# Patient Record
Sex: FEMALE
[De-identification: names, ages, dates, MRNs, and addresses within clinical notes are randomized per-mention and may not be internally consistent; named-entity substitution may affect disease eponyms.]

---

## 2018-04-28 ENCOUNTER — HOSPITAL ENCOUNTER (EMERGENCY)
Dept: HOSPITAL 42 - ED | Age: 28
Discharge: HOME | End: 2018-04-28
Payer: MEDICAID

## 2018-04-28 VITALS — SYSTOLIC BLOOD PRESSURE: 100 MMHG | OXYGEN SATURATION: 99 % | HEART RATE: 74 BPM | DIASTOLIC BLOOD PRESSURE: 58 MMHG

## 2018-04-28 VITALS — TEMPERATURE: 98.3 F

## 2018-04-28 VITALS — RESPIRATION RATE: 18 BRPM

## 2018-04-28 DIAGNOSIS — R10.9: Primary | ICD-10-CM

## 2018-04-28 LAB
ALBUMIN SERPL-MCNC: 4.7 G/DL (ref 3–4.8)
ALBUMIN/GLOB SERPL: 1.6 {RATIO} (ref 1.1–1.8)
ALT SERPL-CCNC: 28 U/L (ref 7–56)
AST SERPL-CCNC: 28 U/L (ref 14–36)
BASOPHILS # BLD AUTO: 0.03 K/MM3 (ref 0–2)
BASOPHILS NFR BLD: 0.3 % (ref 0–3)
BUN SERPL-MCNC: 9 MG/DL (ref 7–21)
CALCIUM SERPL-MCNC: 9.1 MG/DL (ref 8.4–10.5)
EOSINOPHIL # BLD: 0.4 10*3/UL (ref 0–0.7)
EOSINOPHIL NFR BLD: 3.8 % (ref 1.5–5)
ERYTHROCYTE [DISTWIDTH] IN BLOOD BY AUTOMATED COUNT: 16 % (ref 11.5–14.5)
GFR NON-AFRICAN AMERICAN: > 60
GRANULOCYTES # BLD: 7.66 10*3/UL (ref 1.4–6.5)
GRANULOCYTES NFR BLD: 71.6 % (ref 50–68)
HGB BLD-MCNC: 12 G/DL (ref 12–16)
LIPASE SERPL-CCNC: 74 U/L (ref 23–300)
LYMPHOCYTES # BLD: 1.8 10*3/UL (ref 1.2–3.4)
LYMPHOCYTES NFR BLD AUTO: 16.4 % (ref 22–35)
MCH RBC QN AUTO: 25.2 PG (ref 25–35)
MCHC RBC AUTO-ENTMCNC: 32.6 G/DL (ref 31–37)
MCV RBC AUTO: 77.1 FL (ref 80–105)
MONOCYTES # BLD AUTO: 0.8 10*3/UL (ref 0.1–0.6)
MONOCYTES NFR BLD: 7.9 % (ref 1–6)
PLATELET # BLD: 311 10^3/UL (ref 120–450)
PMV BLD AUTO: 9.5 FL (ref 7–11)
RBC # BLD AUTO: 4.77 10^6/UL (ref 3.5–6.1)
WBC # BLD AUTO: 10.7 10^3/UL (ref 4.5–11)

## 2018-04-28 PROCEDURE — 80053 COMPREHEN METABOLIC PANEL: CPT

## 2018-04-28 PROCEDURE — 96361 HYDRATE IV INFUSION ADD-ON: CPT

## 2018-04-28 PROCEDURE — 83690 ASSAY OF LIPASE: CPT

## 2018-04-28 PROCEDURE — 99284 EMERGENCY DEPT VISIT MOD MDM: CPT

## 2018-04-28 PROCEDURE — 85025 COMPLETE CBC W/AUTO DIFF WBC: CPT

## 2018-04-28 PROCEDURE — 96375 TX/PRO/DX INJ NEW DRUG ADDON: CPT

## 2018-04-28 PROCEDURE — 96374 THER/PROPH/DIAG INJ IV PUSH: CPT

## 2018-04-28 PROCEDURE — 74177 CT ABD & PELVIS W/CONTRAST: CPT

## 2018-04-28 NOTE — CT
PROCEDURE:  CT Abdomen and Pelvis with contrast



HISTORY:

abd pain r/o appy r/o cholecystitis



COMPARISON:

None.



TECHNIQUE:

Contrast dose: 100 milliliters visi 350



Radiation dose:



Total exam DLP = 208 mGy-cm.



This CT exam was performed using one or more of the following dose 

reduction techniques: Automated exposure control, adjustment of the 

mA and/or kV according to patient size, and/or use of iterative 

reconstruction technique.



FINDINGS:



LOWER THORAX:

No infiltrate or effusion is seen at the lung bases.  Visualized 

esophagus, stomach, and duodenum are unremarkable although the 

stomach is not adequately distended limiting evaluation for wall 

thickening. 



LIVER:

Liver shows no evidence of focal mass or intrahepatic ductal 

dilatation.  Liver is normal in size. 



GALLBLADDER AND BILE DUCTS:

Unremarkable. 



PANCREAS:

Unremarkable. No gross lesion or ductal dilatation.



SPLEEN:

Unremarkable. 



ADRENALS:

Unremarkable. No mass. 



KIDNEYS AND URETERS:

Unremarkable. No hydronephrosis. No solid mass. 



VASCULATURE:

Unremarkable. No aortic aneurysm. 



BOWEL:

Unremarkable. No obstruction. No gross mural thickening. 



APPENDIX:

Normal appendix. 



PERITONEUM:

Unremarkable. No free fluid. No free air. 



LYMPH NODES:

Unremarkable. No enlarged lymph nodes. 



BLADDER:

Unremarkable. 



REPRODUCTIVE:

Unremarkable. 



BONES:

No acute fracture. 



OTHER FINDINGS:

None.



IMPRESSION:

Unremarkable CT scan of the abdomen for acute inflammatory process.  

No appreciable CT scan evidence of cholecystitis or appendicitis.

## 2019-02-14 ENCOUNTER — HOSPITAL ENCOUNTER (EMERGENCY)
Dept: HOSPITAL 42 - ED | Age: 29
LOS: 1 days | Discharge: HOME | End: 2019-02-15
Payer: MEDICAID

## 2019-02-14 DIAGNOSIS — B34.9: Primary | ICD-10-CM

## 2019-02-14 LAB
APPEARANCE UR: CLEAR
BILIRUB UR-MCNC: NEGATIVE MG/DL
COLOR UR: YELLOW
GLUCOSE UR STRIP-MCNC: NEGATIVE MG/DL
HCG,QUALITATIVE URINE: NEGATIVE
INFLUENZA A B: (no result)
LEUKOCYTE ESTERASE UR-ACNC: NEGATIVE LEU/UL
PH UR STRIP: 8.5 [PH] (ref 4.7–8)
PROT UR STRIP-MCNC: NEGATIVE MG/DL
RBC # UR STRIP: NEGATIVE /UL
SP GR UR STRIP: 1.01 (ref 1–1.03)
UROBILINOGEN UR STRIP-ACNC: 0.2 E.U./DL

## 2019-02-14 NOTE — ED PDOC
Arrival/HPI





- General


Chief Complaint: Fever


Time Seen by Provider: 02/14/19 22:08


Historian: Patient





- History of Present Illness


Narrative History of Present Illness (Text): 





02/14/19 22:23


28 year old female, with no significant past medical history, presents to the 

emergency department with fever and cough, since today.  Patient states she also

has associated headache, and body aches.  Patient states her temperature at home

was 102.  Patient informs she did not try any NSAID antipyretic at home.  

Patient denies any dysuria, chest pain, shortness of breath, abdominal pain, 

nausea, vomiting, diarrhea, back pain, neck pain, or any other complaint.  


Time/Duration: 24 hours


Symptom Onset: Gradual


Symptom Course: Unchanged


Quality: Aching


Activities at Onset: Light


Context: Home





Past Medical History





- Provider Review


Nursing Documentation Reviewed: Yes





- Infectious Disease


Hx of Infectious Diseases: None





- Reproductive


Currently Pregnant: No





- Psychiatric


Hx Substance Use: No





- Anesthesia


Hx Anesthesia: No


Hx Anesthesia Reactions: No


Hx Malignant Hyperthermia: No





Family/Social History





- Physician Review


Nursing Documentation Reviewed: Yes


Family/Social History: No Known Family HX


Smoking Status: Never Smoked


Hx Alcohol Use: No


Hx Substance Use: No





Allergies/Home Meds


Allergies/Adverse Reactions: 


Allergies





No Known Allergies Allergy (Verified 02/14/19 22:16)


   











Review of Systems





- Physician Review


All systems were reviewed & negative as marked: Yes





- Review of Systems


Constitutional: Fevers


Respiratory: Cough.  absent: SOB


Cardiovascular: absent: Chest Pain


Gastrointestinal: absent: Abdominal Pain, Diarrhea, Nausea, Vomiting


Genitourinary Female: absent: Dysuria


Musculoskeletal: absent: Back Pain, Neck Pain


Neurological: Headache





Physical Exam


Vital Signs Reviewed: Yes





Vital Signs











  Temp Pulse Resp BP Pulse Ox


 


 02/14/19 22:15  102.2 F H  116 H  18  112/76  98











Temperature: Febrile


Blood Pressure: Normal


Pulse: Tachycardic


Respiratory Rate: Normal


Appearance: Positive for: Well-Appearing, Non-Toxic, Comfortable


Pain Distress: None


Mental Status: Positive for: Alert and Oriented X 3





- Systems Exam


Head: Present: Atraumatic, Normocephalic


Pupils: Present: PERRL


Extroacular Muscles: Present: EOMI


Conjunctiva: Present: Normal


Mouth: Present: Moist Mucous Membranes


Neck: Present: Normal Range of Motion


Respiratory/Chest: Present: Clear to Auscultation, Good Air Exchange.  No: 

Respiratory Distress, Accessory Muscle Use


Cardiovascular: Present: Regular Rate and Rhythm, Normal S1, S2.  No: Murmurs


Abdomen: No: Tenderness, Distention, Peritoneal Signs


Back: Present: Normal Inspection


Upper Extremity: Present: Normal Inspection.  No: Cyanosis, Edema


Lower Extremity: Present: Normal Inspection.  No: Edema


Neurological: Present: GCS=15, CN II-XII Intact, Speech Normal


Skin: Present: Warm, Dry, Normal Color.  No: Rashes


Psychiatric: Present: Alert, Oriented x 3, Normal Insight, Normal Concentration





Medical Decision Making


ED Course and Treatment: 





02/14/19 22:29


Impression: 28 year old female presents with flu-like symptoms





Plan:


-- Chest X-ray


-- Tylenol


-- Rapid Strep


-- Influenza A/B


-- Urinalysis 


-- Reassess and disposition





Prior Visits:


Notes and results from previous visits were reviewed





Progress Notes:   


02/14/19 23:49


Chest X-ray reviewed by me, shows:


No active disease





02/15/19 00:09


Patient asked me to examine a mole on her back which she reports is "getting 

better". I advised patient to seek outpatient dermatology for biopsy..





02/15/19 01:32


symtoms x 1 day lungs cta cxr neg ua neg. HR on dc 90s, feeling better. susepct 

tachhycardia 2/2 fever. unleikely sepsis. strict return precations





- RAD Interpretation


Radiology Orders: 











02/14/19 22:21


CHEST TWO VIEWS (PA/LAT) [RAD] Stat 














- Medication Orders


Current Medication Orders: 











Acetaminophen (Tylenol 325mg Tab)  975 mg PO STAT STA


   Stop: 02/14/19 22:22











- Scribe Statement


The provider has reviewed the documentation as recorded by the Scribe


Lawrence Leyva





Provider Scribe Attestation:


All medical record entries made by the Scribe were at my direction and 

personally dictated by me. I have reviewed the chart and agree that the record 

accurately reflects my personal performance of the history, physical exam, 

medical decision making, and the department course for this patient. I have also

 personally directed, reviewed, and agree with the discharge instructions and 

disposition.











Disposition/Present on Arrival





- Present on Arrival


Any Indicators Present on Arrival: No


History of DVT/PE: No


History of Uncontrolled Diabetes: No


Urinary Catheter: No


History of Decub. Ulcer: No


History Surgical Site Infection Following: None





- Disposition


Have Diagnosis and Disposition been Completed?: Yes


Diagnosis: 


 Viral syndrome





Disposition: HOME/ ROUTINE


Disposition Time: 12:30


Condition: STABLE


Discharge Instructions (ExitCare):  Flu, Cough, Runny Nose, and the Common Cold,

 Viral Syndrome (DC)


Additional Instructions: 


return to any emergency room with worsening. 


Prescriptions: 


Oseltamivir Phosphate [Tamiflu] 75 mg PO BID #10 capsule


Referrals: 


Gianluca Grajeda MD [Primary Care Provider] - Follow up with primary


Forms:  [employer] (English)

## 2019-02-15 VITALS
RESPIRATION RATE: 16 BRPM | TEMPERATURE: 99.2 F | SYSTOLIC BLOOD PRESSURE: 106 MMHG | OXYGEN SATURATION: 97 % | DIASTOLIC BLOOD PRESSURE: 57 MMHG | HEART RATE: 92 BPM

## 2019-02-15 NOTE — RAD
Date of service: 



02/14/2019



HISTORY:

 cough 



COMPARISON:

No prior.



TECHNIQUE:

Chest PA and lateral



FINDINGS:



LUNGS:

No radiographic evidence of focal consolidation in the lungs.



PLEURA:

No significant pleural effusion identified. No pneumothorax apparent.



CARDIOVASCULAR:

No aortic atherosclerotic calcification present.



Normal cardiac size. No pulmonary vascular congestion. 



OSSEOUS STRUCTURES:

No significant abnormalities.



VISUALIZED UPPER ABDOMEN:

Normal.



OTHER FINDINGS:

None.



IMPRESSION:

No evidence of pneumonia or consolidation in the lungs.

## 2023-01-23 NOTE — ED PDOC
Arrival/HPI





- General


Chief Complaint: Abdominal Pain


Time Seen by Provider: 04/28/18 08:22


Historian: Patient





- History of Present Illness


Narrative History of Present Illness (Text): 


04/28/18 08:36





A 27 year old female, with no significant past medical history, presents to the 

emergency room complaining of 2 week duration right sided abdominal pain and 

joint pain. She describes the pain as sharp and intermittent. The patient 

states that she has also been experiencing decreased appetite, a decrease in 

urine output, and some loose stools. The patient denies fevers, chills, headache

, dizziness, sore throat, cough, chest pain, shortness of breath, dyspnea on 

exertion, nausea, vomiting, diarrhea, neck/back pain, or any other complaint. 


 


Time/Duration: Other (2 weeks)


Symptom Onset: Gradual


Symptom Course: Unchanged


Activities at Onset: Rest, Light


Context: Home





Past Medical History





- Provider Review


Nursing Documentation Reviewed: Yes





- Infectious Disease


Hx of Infectious Diseases: None





- Psychiatric


Hx Substance Use: No





- Anesthesia


Hx Anesthesia: No


Hx Anesthesia Reactions: No


Hx Malignant Hyperthermia: No





Family/Social History





- Physician Review


Nursing Documentation Reviewed: Yes


Family/Social History: No Known Family HX


Smoking Status: Never Smoked


Hx Alcohol Use: No


Hx Substance Use: No





Allergies/Home Meds


Allergies/Adverse Reactions: 


Allergies





No Known Allergies Allergy (Verified 04/28/18 08:31)


 











Review of Systems





- Physician Review


All systems were reviewed & negative as marked: Yes





- Review of Systems


Constitutional: absent: Fevers, Night Sweats


ENT: absent: Sore Throat


Respiratory: absent: SOB, Cough


Cardiovascular: absent: Chest Pain, GILBERT


Gastrointestinal: Abdominal Pain, Stool Changes (Loose stools), Appetite 

Changes (Decreased appetite).  absent: Diarrhea, Nausea, Vomiting


Genitourinary Female: Urine Output Changes (Decreased urine output.)


Musculoskeletal: Arthralgias.  absent: Back Pain, Neck Pain


Neurological: absent: Headache, Dizziness





Physical Exam


Vital Signs Reviewed: Yes


Vital Signs











  Temp Pulse Resp BP Pulse Ox


 


 04/28/18 13:02  98.3 F  74  18  100/58 L  99


 


 04/28/18 12:00   82  18  102/79  98


 


 04/28/18 10:14   88  18  110/79  98


 


 04/28/18 08:34  98.3 F    


 


 04/28/18 08:27   116 H  20  109/58 L  99











Temperature: Afebrile


Blood Pressure: Hypotensive


Pulse: Tachycardic


Respiratory Rate: Normal


Appearance: Positive for: Well-Appearing, Non-Toxic, Comfortable


Pain Distress: None


Mental Status: Positive for: Alert and Oriented X 3





- Systems Exam


Head: Present: Atraumatic, Normocephalic


Pupils: Present: PERRL


Extroacular Muscles: Present: EOMI


Conjunctiva: Present: Normal


Mouth: Present: Moist Mucous Membranes


Neck: Present: Normal Range of Motion


Respiratory/Chest: Present: Clear to Auscultation, Good Air Exchange.  No: 

Respiratory Distress, Accessory Muscle Use


Cardiovascular: Present: Tachycardic


Abdomen: Present: Tenderness (Tenderness to RUQ and RLQ), Guarding.  No: Rebound

, McBurney's Point Tender


Back: Present: Normal Inspection


Upper Extremity: Present: Normal Inspection.  No: Cyanosis, Edema


Lower Extremity: Present: Normal Inspection.  No: Edema


Neurological: Present: GCS=15, CN II-XII Intact, Speech Normal


Skin: Present: Warm, Dry, Normal Color.  No: Rashes


Psychiatric: Present: Alert, Oriented x 3, Normal Insight, Normal Concentration





Medical Decision Making


ED Course and Treatment: 


04/28/18 08:42





Impression:


A 27 year old female presents to the emergency room complaining of 2 week 

duration sharp, intermittent right sided abdominal pain, joint pain, decreased 

appetite, and loose stools. 





r/o Cholecytitis r/o Appy





Plan:


-- Abdomen/Pelvis CT


-- Labs


-- Pepcid 


-- Reassess and disposition








Progress Notes:








PROCEDURE:  CT Abdomen and Pelvis with contrast


Dictator : David Goldman MD


Report Date : 04/28/2018 12:15:36


IMPRESSION: Unremarkable CT scan of the abdomen for acute inflammatory process.

  No appreciable CT scan evidence of cholecystitis or appendicitis.





04/28/18 12:52: On re-evaluation the patient feels better and is in no acute 

distress. Abdomen is non-tender. Patient is tolerating fluids. I have discussed 

the results and plan with the patient, who expresses understanding. Patient is 

in agreement with the plan to discharge the patient home. Patient is stable for 

discharge. Patient was instructed to follow up with PMD in 1-2 days or return 

if symptoms persist/worsen or new concerning symptoms arise. 











- Lab Interpretations


Lab Results: 








 04/28/18 09:00 





 04/28/18 09:00 





 Lab Results





04/28/18 09:00: Sodium 141, Potassium 3.5 L, Chloride 103, Carbon Dioxide 22, 

Anion Gap 19, BUN 9, Creatinine 0.5 L, Est GFR ( Amer) > 60, Est GFR (Non

-Af Amer) > 60, Random Glucose 92, Calcium 9.1, Total Bilirubin 0.3, AST 28, 

ALT 28, Alkaline Phosphatase 82, Total Protein 7.7, Albumin 4.7, Globulin 3.0, 

Albumin/Globulin Ratio 1.6, Lipase 74


04/28/18 09:00: WBC 10.7, RBC 4.77, Hgb 12.0, Hct 36.8, MCV 77.1 L, MCH 25.2, 

MCHC 32.6, RDW 16.0 H, Plt Count 311, MPV 9.5, Gran % 71.6 H, Lymph % (Auto) 

16.4 L, Mono % (Auto) 7.9 H, Eos % (Auto) 3.8, Baso % (Auto) 0.3, Gran # 7.66 H

, Lymph # (Auto) 1.8, Mono # (Auto) 0.8 H, Eos # (Auto) 0.4, Baso # (Auto) 0.03








I have reviewed the lab results: Yes





- RAD Interpretation


Radiology Orders: 








04/28/18 08:32


ABD PELVIS PO & IV CONTRAST [CT] Stat 














- Medication Orders


Current Medication Orders: 











Discontinued Medications





Famotidine (Pepcid)  20 mg IVP STAT STA


   Stop: 04/28/18 08:33


   Last Admin: 04/28/18 08:59  Dose: 20 mg





IVP Administration


 Document     04/28/18 08:59  EWO  (Rec: 04/28/18 09:00  HIPOLITO [id number])


     Charges for Administration


      # of IVP Administrations                   1





Sodium Chloride (Sodium Chloride 0.9%)  1,000 mls @ 1,000 mls/hr IV .Q1H STA


   Stop: 04/28/18 09:31


   Last Admin: 04/28/18 09:00  Dose: 1,000 mls/hr





eMAR Start Stop


 Document     04/28/18 09:00  HIPOLITO  (Rec: 04/28/18 09:00  HIPOLITO [id number])


     Intravenous Solution


      Start Date                                 04/28/18


      Start Time                                 09:00


      End Date                                   04/28/18


      End time                                   10:00


      Total Infusion Time                        60





Ketorolac Tromethamine (Toradol)  30 mg IVP STAT STA


   Stop: 04/28/18 08:33


   Last Admin: 04/28/18 09:00  Dose: 30 mg





MAR Pain Assessment


 Document     04/28/18 09:00  EWO  (Rec: 04/28/18 09:00  [employer]  [id number])


     Pain Reassessment


      Is this a pain reassessment?               No


     Sleep


      Is patient sleeping during reassessment?   No


     Presence of Pain


      Presence of Pain                           Yes


     Pain Scale Used


      Pain Scale Used                            Numeric


     Location


      Left, Right or Bilateral                   Right


      Upper or Lower                             Lower


      Pain Location Body Site                    Abdomen


     Description


      Description                                Intermittent


      Intensity of Pain at present               3


IVP Administration


 Document     04/28/18 09:00  EWO  (Rec: 04/28/18 09:00  [employer]  [id number])


     Charges for Administration


      # of IVP Administrations                   1





Potassium Chloride (K-Dur 20 Meq Er Tab)  40 meq PO STAT STA


   Stop: 04/28/18 09:18


   Last Admin: 04/28/18 09:32  Dose: 40 meq











- Scribe Statement


The provider has reviewed the documentation as recorded by the Paula Siegel





Provider Scribe Attestation:


All medical record entries made by the Scribpeter were at my direction and 

personally dictated by me. I have reviewed the chart and agree that the record 

accurately reflects my personal performance of the history, physical exam, 

medical decision making, and the department course for this patient. I have 

also personally directed, reviewed, and agree with the discharge instructions 

and disposition.








Disposition/Present on Arrival





- Present on Arrival


Any Indicators Present on Arrival: No


History of DVT/PE: No


History of Uncontrolled Diabetes: No


Urinary Catheter: No


History of Decub. Ulcer: No


History Surgical Site Infection Following: None





- Disposition


Have Diagnosis and Disposition been Completed?: Yes


Diagnosis: 


 Abdominal pain





Disposition: HOME/ ROUTINE


Disposition Time: 13:02


Patient Plan: Discharge


Condition: IMPROVED


Discharge Instructions (ExitCare):  Acute Abdomen (Belly Pain)


Additional Instructions: 





Ms Irving, thank you for letting us take care of you today. Your 

provider was Dr. Walker. You were treated for Abdominal Pain. The emergency 

medical care you received today was directed at your acute symptoms. If you 

were prescribed any medication, please fill it and take as directed. It may 

take several days for your symptoms to resolve. Return to the Emergency 

Department if your symptoms worsen, do not improve, or if you have any other 

problems.





Please contact your doctor or call one of the physicians/clinics you have been 

referred to that are listed on the Patient Visit Information form that is 

included in your discharge packet. Bring any paperwork you were given at 

discharge with you along with any medications you are taking to your follow up 

visit. Our treatment cannot replace ongoing medical care by a primary care 

provider (PCP) outside of the emergency department.





Thank you for allowing the [facility] team to be part of your care today.








If you had an X-Ray or CT scan: A Radiologist will review the ED reading if any 

change in treatment is needed we will contact you.***





If you had a blood, urine, or wound culture: It will take several days for the 

results, if any change in treatment is needed we will contact you.***





If you had an STI test: It will take 48 hours for the results. Please call 

after 1 week if you have not heard back.***


Prescriptions: 


Ranitidine HCl [Zantac] 150 mg PO BID PRN #30 tablet


 PRN Reason: Pain, Mild (1-3)


Referrals: 


[facility] [place] [Outside] - Follow up with primary


[facility] [Non-Staff] - Follow up with primary


Forms:  Prepared Response (English), WORK NOTE No